# Patient Record
Sex: MALE | Race: WHITE | Employment: FULL TIME | URBAN - METROPOLITAN AREA
[De-identification: names, ages, dates, MRNs, and addresses within clinical notes are randomized per-mention and may not be internally consistent; named-entity substitution may affect disease eponyms.]

---

## 2019-01-14 ENCOUNTER — OFFICE VISIT (OUTPATIENT)
Dept: FAMILY MEDICINE | Facility: CLINIC | Age: 29
End: 2019-01-14
Payer: COMMERCIAL

## 2019-01-14 VITALS
TEMPERATURE: 98.1 F | HEIGHT: 73 IN | HEART RATE: 72 BPM | DIASTOLIC BLOOD PRESSURE: 62 MMHG | BODY MASS INDEX: 26.24 KG/M2 | SYSTOLIC BLOOD PRESSURE: 120 MMHG | WEIGHT: 198 LBS

## 2019-01-14 DIAGNOSIS — B07.8 COMMON WART: Primary | ICD-10-CM

## 2019-01-14 PROCEDURE — 17110 DESTRUCTION B9 LES UP TO 14: CPT | Performed by: NURSE PRACTITIONER

## 2019-01-14 ASSESSMENT — MIFFLIN-ST. JEOR: SCORE: 1922

## 2019-01-14 NOTE — PATIENT INSTRUCTIONS
WART CARE DISCUSSED. USE OF OTC PRODUCT STARTING IN FEW DAYS. GENTLE ABRAISION WITH PUMICE STONE OR EMERY BOARD WITH GOOD HANDWASHING AFTER. RETURN IN TWO WEEKS FOR REFREEZING UNTIL RESOLVED.

## 2019-01-14 NOTE — PROGRESS NOTES
"  SUBJECTIVE:                                                      Bryn Mccrary is a 28 year old male who presents to clinic today for the following health issues:    Concern - Pt states that for 2 months he has had a wart on the top of his right foot - has never been treated for it in the past.     HPI: Non-painful wart on right lateral dorsum of foot. No bleeding or discharge noted.     Problem list and histories reviewed & adjusted, as indicated.  Additional history: as documented    Reviewed and updated as needed this visit by clinical staff  Tobacco  Allergies  Meds  Problems  Med Hx  Surg Hx  Fam Hx  Soc Hx        Reviewed and updated as needed this visit by Provider  Tobacco  Allergies  Meds  Problems  Med Hx  Surg Hx  Fam Hx         ROS:  Constitutional, skin systems are negative, except as otherwise noted.    OBJECTIVE:                                                      /62 (BP Location: Left arm, Patient Position: Chair, Cuff Size: Adult Regular)   Pulse 72   Temp 98.1  F (36.7  C) (Tympanic)   Ht 1.854 m (6' 1\")   Wt 89.8 kg (198 lb)   BMI 26.12 kg/m   Body mass index is 26.12 kg/m .   GENERAL: healthy, alert, well nourished, well hydrated, no distress  SKIN: Raised, rough verruciform lesion (3 mm diameter) over dorsum of right foot.     Diagnostic test results:  none     ASSESSMENT/PLAN:                                                      Bryn was seen today for wart.      Lesion abraded / scraped with 11 blade scalpel and frozen with LN2 x3. Patient tolerated procedure well. No complications evident.     Diagnoses and all orders for this visit:    Common wart  PLAN:  WART CARE DISCUSSED. USE OF OTC PRODUCT STARTING IN FEW DAYS. GENTLE ABRAISION WITH PUMICE STONE OR EMERY BOARD WITH GOOD HANDWASHING AFTER. RETURN IN TWO WEEKS FOR REFREEZING UNTIL RESOLVED.      Risks, benefits and alternatives of treatments discussed. Plan agreed upon and all questions answered.  "     Follow-Up: Return in about 4 weeks (around 2/11/2019) for recurrent symptoms despite current therapy.    See Patient Instructions      PAULA Aparicio, CNP

## 2019-02-18 ENCOUNTER — OFFICE VISIT (OUTPATIENT)
Dept: FAMILY MEDICINE | Facility: CLINIC | Age: 29
End: 2019-02-18
Payer: COMMERCIAL

## 2019-02-18 VITALS
SYSTOLIC BLOOD PRESSURE: 112 MMHG | OXYGEN SATURATION: 100 % | BODY MASS INDEX: 25.6 KG/M2 | HEART RATE: 106 BPM | DIASTOLIC BLOOD PRESSURE: 72 MMHG | TEMPERATURE: 97.6 F | WEIGHT: 194 LBS

## 2019-02-18 DIAGNOSIS — B07.8 COMMON WART: Primary | ICD-10-CM

## 2019-02-18 PROCEDURE — 17110 DESTRUCTION B9 LES UP TO 14: CPT | Performed by: NURSE PRACTITIONER

## 2019-02-18 NOTE — PATIENT INSTRUCTIONS
WART CARE DISCUSSED. USE OF OTC PRODUCT STARTING IN FEW DAYS. GENTLE ABRASION WITH PUMICE STONE OR EMERY BOARD WITH GOOD HANDWASHING AFTER. RETURN IN TWO - FOUR WEEKS FOR REFREEZING UNTIL RESOLVED.

## 2019-02-18 NOTE — PROGRESS NOTES
SUBJECTIVE:                                                      Bryn Mccrary is a 28 year old male who presents to clinic today for the following health issues:    Concern - Derm problem   Onset: x 3 months     Description:   On top of the right foot, round, pinkish, hard, dry, flaky    Intensity: 0/10    Progression of Symptoms:  same    Accompanying Signs & Symptoms:  Denies discomfort, pain, pus, and drainage     Previous history of similar problem:   Once before     Precipitating factors:   Worsened by:     Alleviating factors:  Improved by: None     Therapies Tried and outcome: Cryo one month ago. W compound - shows no improvement     HPI: Non-painful wart on right lateral dorsum of foot. No bleeding or discharge noted.     Problem list and histories reviewed & adjusted, as indicated.  Additional history: as documented    Reviewed and updated as needed this visit by clinical staff  Tobacco  Allergies  Meds  Problems  Med Hx  Surg Hx  Fam Hx  Soc Hx        Reviewed and updated as needed this visit by Provider  Tobacco  Allergies  Meds  Problems  Med Hx  Surg Hx  Fam Hx         ROS:  Constitutional, skin systems are negative, except as otherwise noted.      OBJECTIVE:                                                      /72   Pulse 106   Temp 97.6  F (36.4  C)   Wt 88 kg (194 lb)   SpO2 100%   BMI 25.60 kg/m   Body mass index is 25.6 kg/m .   GENERAL: healthy, alert, well nourished, well hydrated, no distress  SKIN: Raised, rough verruciform lesion (3 mm diameter) over dorsum of right foot.     Diagnostic test results:  none     ASSESSMENT/PLAN:                                                      Bryn was seen today for derm problem.    Lesion abraded / scraped with 11 blade scalpel and frozen with LN2 x3. Patient tolerated procedure well. No complications evident.     Diagnoses and all orders for this visit:    Common wart  -     DESTRUCT BENIGN LESION, UP TO 14    PLAN:  WART  CARE DISCUSSED. USE OF OTC PRODUCT STARTING IN FEW DAYS. GENTLE ABRASION WITH PUMICE STONE OR EMERY BOARD WITH GOOD HANDWASHING AFTER. RETURN IN TWO WEEKS FOR REFREEZING UNTIL RESOLVED.       Risks, benefits and alternatives of treatments discussed. Plan agreed upon and all questions answered.      Follow-Up: Return in about 4 weeks (around 3/18/2019).    See Patient Instructions      PAULA Aparicio, CNP

## 2019-09-30 ENCOUNTER — HEALTH MAINTENANCE LETTER (OUTPATIENT)
Age: 29
End: 2019-09-30

## 2020-12-28 ENCOUNTER — NURSE TRIAGE (OUTPATIENT)
Dept: NURSING | Facility: CLINIC | Age: 30
End: 2020-12-28

## 2020-12-28 ENCOUNTER — ANCILLARY PROCEDURE (OUTPATIENT)
Dept: GENERAL RADIOLOGY | Facility: CLINIC | Age: 30
End: 2020-12-28
Attending: PHYSICIAN ASSISTANT
Payer: COMMERCIAL

## 2020-12-28 ENCOUNTER — OFFICE VISIT (OUTPATIENT)
Dept: URGENT CARE | Facility: URGENT CARE | Age: 30
End: 2020-12-28
Payer: COMMERCIAL

## 2020-12-28 VITALS
BODY MASS INDEX: 25.46 KG/M2 | RESPIRATION RATE: 20 BRPM | SYSTOLIC BLOOD PRESSURE: 132 MMHG | HEART RATE: 71 BPM | OXYGEN SATURATION: 100 % | WEIGHT: 193 LBS | TEMPERATURE: 98.9 F | DIASTOLIC BLOOD PRESSURE: 79 MMHG

## 2020-12-28 DIAGNOSIS — R07.89 ATYPICAL CHEST PAIN: Primary | ICD-10-CM

## 2020-12-28 DIAGNOSIS — R00.0 TACHYCARDIA: ICD-10-CM

## 2020-12-28 DIAGNOSIS — R07.89 CHEST PRESSURE: ICD-10-CM

## 2020-12-28 LAB
ALBUMIN SERPL-MCNC: 3.8 G/DL (ref 3.4–5)
ALP SERPL-CCNC: 47 U/L (ref 40–150)
ALT SERPL W P-5'-P-CCNC: 53 U/L (ref 0–70)
ANION GAP SERPL CALCULATED.3IONS-SCNC: 3 MMOL/L (ref 3–14)
AST SERPL W P-5'-P-CCNC: 49 U/L (ref 0–45)
BASOPHILS # BLD AUTO: 0 10E9/L (ref 0–0.2)
BASOPHILS NFR BLD AUTO: 0.4 %
BILIRUB SERPL-MCNC: 0.5 MG/DL (ref 0.2–1.3)
BUN SERPL-MCNC: 14 MG/DL (ref 7–30)
CALCIUM SERPL-MCNC: 8.7 MG/DL (ref 8.5–10.1)
CHLORIDE SERPL-SCNC: 110 MMOL/L (ref 94–109)
CO2 SERPL-SCNC: 27 MMOL/L (ref 20–32)
CREAT SERPL-MCNC: 0.83 MG/DL (ref 0.66–1.25)
DIFFERENTIAL METHOD BLD: NORMAL
EOSINOPHIL # BLD AUTO: 0.2 10E9/L (ref 0–0.7)
EOSINOPHIL NFR BLD AUTO: 3.1 %
ERYTHROCYTE [DISTWIDTH] IN BLOOD BY AUTOMATED COUNT: 13 % (ref 10–15)
ERYTHROCYTE [SEDIMENTATION RATE] IN BLOOD BY WESTERGREN METHOD: 6 MM/H (ref 0–15)
GFR SERPL CREATININE-BSD FRML MDRD: >90 ML/MIN/{1.73_M2}
GLUCOSE SERPL-MCNC: 92 MG/DL (ref 70–99)
HCT VFR BLD AUTO: 45.1 % (ref 40–53)
HGB BLD-MCNC: 14.9 G/DL (ref 13.3–17.7)
LYMPHOCYTES # BLD AUTO: 1.6 10E9/L (ref 0.8–5.3)
LYMPHOCYTES NFR BLD AUTO: 28.5 %
MCH RBC QN AUTO: 30.9 PG (ref 26.5–33)
MCHC RBC AUTO-ENTMCNC: 33 G/DL (ref 31.5–36.5)
MCV RBC AUTO: 94 FL (ref 78–100)
MONOCYTES # BLD AUTO: 0.4 10E9/L (ref 0–1.3)
MONOCYTES NFR BLD AUTO: 7.9 %
NEUTROPHILS # BLD AUTO: 3.3 10E9/L (ref 1.6–8.3)
NEUTROPHILS NFR BLD AUTO: 60.1 %
PLATELET # BLD AUTO: 218 10E9/L (ref 150–450)
POTASSIUM SERPL-SCNC: 4.3 MMOL/L (ref 3.4–5.3)
PROT SERPL-MCNC: 7.5 G/DL (ref 6.8–8.8)
RBC # BLD AUTO: 4.82 10E12/L (ref 4.4–5.9)
SODIUM SERPL-SCNC: 140 MMOL/L (ref 133–144)
TROPONIN I SERPL-MCNC: <0.015 UG/L (ref 0–0.04)
TSH SERPL DL<=0.005 MIU/L-ACNC: 1.4 MU/L (ref 0.4–4)
WBC # BLD AUTO: 5.5 10E9/L (ref 4–11)

## 2020-12-28 PROCEDURE — 99215 OFFICE O/P EST HI 40 MIN: CPT | Performed by: PHYSICIAN ASSISTANT

## 2020-12-28 PROCEDURE — 93000 ELECTROCARDIOGRAM COMPLETE: CPT | Performed by: PHYSICIAN ASSISTANT

## 2020-12-28 PROCEDURE — 71046 X-RAY EXAM CHEST 2 VIEWS: CPT | Performed by: RADIOLOGY

## 2020-12-28 PROCEDURE — 85652 RBC SED RATE AUTOMATED: CPT | Performed by: PHYSICIAN ASSISTANT

## 2020-12-28 PROCEDURE — 80050 GENERAL HEALTH PANEL: CPT | Performed by: PHYSICIAN ASSISTANT

## 2020-12-28 PROCEDURE — 36415 COLL VENOUS BLD VENIPUNCTURE: CPT | Performed by: PHYSICIAN ASSISTANT

## 2020-12-28 PROCEDURE — 84484 ASSAY OF TROPONIN QUANT: CPT | Performed by: PHYSICIAN ASSISTANT

## 2020-12-28 NOTE — TELEPHONE ENCOUNTER
"Patient states he has had intermittent  chest/heart pain for the past couple of weeks.  Pain is worse if he drinks alcohol or caffeine and his heart rate increased with moderate activity.  Unsure if pain radiates to arm.  Pain is \"minor\" now.  Routed to PCP for Second Level Triage and advised patient to go to ED with severe chest pain, difficulty breathing.    Zandra Jackson RN  [facility] Triage Nurse Advisor    Please close encounter when completed.    Additional Information    Negative: Severe difficulty breathing (e.g., struggling for each breath, speaks in single words)    Negative: Passed out (i.e., fainted, collapsed and was not responding)    Negative: Chest pain lasting longer than 5 minutes and ANY of the following:* Over 50 years old* Over 30 years old and at least one cardiac risk factor (i.e., high blood pressure, diabetes, high cholesterol, obesity, smoker or strong family history of heart disease)* Pain is crushing, pressure-like, or heavy * Took nitroglycerin and chest pain was not relieved* History of heart disease (i.e., angina, heart attack, bypass surgery, angioplasty, CHF)    Negative: Visible sweat on face or sweat dripping down face    Negative: Sounds like a life-threatening emergency to the triager    Negative: Followed an injury to chest    Negative: SEVERE chest pain    Negative: Pain also present in shoulder(s) or arm(s) or jaw    Negative: Difficulty breathing    Negative: Cocaine use within last 3 days    Negative: History of prior 'blood clot' in leg or lungs (i.e., deep vein thrombosis, pulmonary embolism)    Negative: Recent illness requiring prolonged bed rest (i.e., immobilization)    Negative: Hip or leg fracture in past 2 months (e.g, or had cast on leg or ankle)    Negative: Major surgery in the past month    Negative: Recent long-distance travel with prolonged time in car, bus, plane, or train (i.e., within past 2 weeks; 6 or more hours duration)    Negative: Heart " beating irregularly or very rapidly    Negative: Chest pain lasting longer than 5 minutes    Intermittent chest pain and pain has been increasing in severity or frequency    Protocols used: CHEST PAIN-A-OH

## 2020-12-28 NOTE — TELEPHONE ENCOUNTER
Called patient- he wants to be seen today- advised urgent care now as we do not have any openings in clinic today  States he does not have SOB/ worsening CP or rapid HR  He also cannot elicit the pain with breathing or movement.  Patient lives in [place] and will go to Urgent care now- stats he has to be to work in an hour so he will go now.    Elo QUIROSRN BSN  [facility]  [phone]

## 2020-12-28 NOTE — TELEPHONE ENCOUNTER
If chest pain worsens with activity or if he finds that he is having shortness of breath, he needs to go to the ED now. If his heart rate is rapid, he needs to go to the ED (could be PE or ACS). If he can elicit the pain with manual pressure or if it changes in character / severity when taking deep breaths, he may be able to wait until he can be seen in clinic. I have no openings, myself, today, so he would need to schedule with someone else. He should probably been seen today, however.     Thanks,    Sajan

## 2020-12-28 NOTE — PATIENT INSTRUCTIONS
Patient Education     Uncertain Causes of Chest Pain    Chest pain can happen for a number of reasons. Sometimes the cause can't be determined. If your condition does not seem serious, and your pain does not appear to be coming from your heart, your healthcare provider may recommend watching it closely. Sometimes the signs of a serious problem take more time to appear. Many problems not related to your heart can cause chest pain. These include:    Musculoskeletal. Costochondritis is an inflammation of the tissues around the ribs that can occur from trauma or overuse injuries, or a strain of the muscles of the chest wall    Respiratory. Pneumonia, collapsed lung (pneumothorax), or inflammation of the lining of the chest and lungs (pleurisy)    Gastrointestinal. Esophageal reflux, heartburn, ulcers, or gallbladder disease    Anxiety and panic disorders    Nerve compression and inflammation    Rare miscellaneous problems such as aortic aneurysm (a swelling of the large artery coming out of the heart) or pulmonary embolism (a blood clot in the lungs)  Home care  After your visit, follow these recommendations:    Rest today and avoid strenuous activity.    Take any prescribed medicine as directed.    Be aware of any recurrent chest pain and notice any changes  Follow-up care  Follow up with your healthcare provider if you do not start to feel better within 24 hours, or as advised.  Call 911  Call 911 if any of these occur:    A change in the type of pain: if it feels different, becomes more severe, lasts longer, or begins to spread into your shoulder, arm, neck, jaw or back    Shortness of breath or increased pain with breathing    Weakness, dizziness, or fainting    Rapid heart beat    Crushing sensation in your chest  When to seek medical advice  Call your healthcare provider right away if any of the following occur:    Cough with dark colored sputum (phlegm) or blood    Fever of 100.4 F (38 C) or higher, or as  directed by your healthcare provider    Swelling, pain or redness in one leg  Albert last reviewed this educational content on 5/1/2018 2000-2020 [employer]. [address] [facility], [place] [address]. All rights reserved. This information is not intended as a substitute for professional medical care. Always follow your healthcare professional's instructions.           Patient Education     Understanding Tachycardia    The heart has an electrical system that sends signals to control the heartbeat. Any abnormal change in the speed or pattern of the heartbeat is called an arrhythmia. If you have an arrhythmia that causes the heart to beat faster than normal, this is known as tachycardia. There are many types of tachycardia. They can affect the heart s upper chambers (atria), the heart s lower chambers (ventricles), or both.   What causes tachycardia?   Many things can cause tachycardia, including:    Damage to heart tissue from heart disease, past heart attack, or heart surgery    Abnormal electrical pathways in the heart    Problems with the heart s structure that you are born with (congenital heart defect)    High blood pressure    Overactive thyroid    Use of certain medicines    Severe blood loss or anemia    Dehydration    Severe stress, fear, or anxiety    Smoking    Too much alcohol or caffeine    Abuse of certain street drugs, such as cocaine    Infections    Certain inflammatory conditions    Chronic  pain syndromes  What are the symptoms of tachycardia?   Tachycardia can cause a fast, pounding, or irregular heartbeat. It can also make it harder for the heart to pump blood efficiently to the body. This may cause symptoms such as:     Shortness of breath    Tiredness    Dizziness or fainting    Chest pain  Some people with tachycardia may have no symptoms at all.  How is tachycardia treated?   Treatment for tachycardia depends on the cause. It also depends on the type you have and how severe  your symptoms are. Tachycardia in the ventricles is often more serious than in the atria. For this reason, it may need to be treated right away. Possible treatments include:     Treating the underlying cause. For instance, if a medicine is causing your tachycardia, changing the dosage or stopping the medicine with your doctor s guidance may correct the problem.    Lifestyle changes. These include getting enough sleep and reducing stress. They also include avoiding caffeine, alcohol, tobacco, and illegal drugs.    Vagal maneuvers. These are techniques that may help interrupt a fast heartbeat and slow it down. One example is to take a deep breath and bear down hard while holding your breath.     Medicines. These may be used to help slow down a fast heartbeat. They may also be used to regulate the pattern of the heartbeat.    Electrical cardioversion. Special pads or paddles are used to send one or more brief  electrical shocks to the heart. This can help restore the heartbeat to normal.    Ablation. A long thin tube called a catheter is inserted into a blood vessel and threaded to the heart. The catheter sends out hot or cold energy to the areas causing abnormal signals. This destroys tissue or cells where the abnormal electrical signal originates. This may stop certain types of tachycardia.    Pacemaker. This is a device that is placed just under the skin in the chest. It sends paced signals to make the heart beat at a predetermined rate and rhythm. While it can't slow your heart rate, you may need this if certain medicines used to treat tachycardia result in a heart rate that is too slow .      Implantable cardioverter defibrillator (ICD).  This is a device that is placed just under the skin in the chest or armpit. The ICD monitors your heart rate. When needed, it sends controlled burst of signals to the heart to overdrive a tachycardia in the ventricles.  It can also send a single stronger shock to the heart to  stop a life-threatening type of tachycardia, if needed.    Surgery.During surgery, different techniques may be used to create scar tissue in the areas of the heart causing abnormal signals. This may help stop certain types of tachycardia.  What are possible complications of tachycardia?   These can include:    Blood clots or stroke    Heart failure. With this problem, the heart muscle is so weak it no longer pumps blood well    Fainting    Sudden cardiac arrest. This is when the heart suddenly stops beating  When should I call my healthcare provider?   Call your healthcare provider right away if you have any of these:    Symptoms that don t get better with treatment, or get worse    New symptoms  Albert last reviewed this educational content on 5/1/2019 2000-2020 [employer]. [address] [place] [address], [place] [address]. All rights reserved. This information is not intended as a substitute for professional medical care. Always follow your healthcare professional's instructions.

## 2020-12-29 NOTE — PROGRESS NOTES
SUBJECTIVE:  Bryn Mccrary is a 30 year old male who presents to the office with the CC of intermittent tachycardia  Patient complains of tachycardia, mild chest discomfort.  The pain is characterized as mild dull located mid chst with radiation to none. Symptoms began this last week and are interemittent  Cardiac risk factors: He has cut back margarita, caffeine    Past Medical History:   Diagnosis Date     Depression      NO ACTIVE PROBLEMS      ALLERGIES  No Known Allergies     Family History   Problem Relation Age of Onset     Family History Negative Mother      Family History Negative Father      Diabetes No family hx of      Cancer - colorectal No family hx of        Social History     Tobacco Use     Smoking status: Former Smoker     Smokeless tobacco: Never Used   Substance Use Topics     Alcohol use: Yes     Comment: social       ROS:CONSTITUTIONAL:NEGATIVE for fever, chills, change in weight  INTEGUMENTARY/SKIN: NEGATIVE for worrisome rashes, moles or lesions  ENT/MOUTH: NEGATIVE for ear, mouth and throat problems  RESP:NEGATIVE for significant cough or SOB  CV: POSITIVE for tachycardia  GI: NEGATIVE for nausea, abdominal pain, heartburn, or change in bowel habits  : negative for dysuria, hematuria, decreased urinary stream, erectile dysfunction  MUSCULOSKELETAL: NEGATIVE for significant arthralgias or myalgia  NEURO: NEGATIVE for weakness, dizziness or paresthesias  PSYCHIATRIC: NEGATIVE for changes in mood or affect    EXAM:  /79   Pulse 71   Temp 98.9  F (37.2  C)   Resp 20   Wt 87.5 kg (193 lb)   SpO2 100%   BMI 25.46 kg/m    GENERAL APPEARANCE: healthy, alert and no distress  EYES: EOMI,  PERRL, conjunctiva clear  HENT: ear canals and TM's normal.  Nose and mouth without ulcers, erythema or lesions  NECK: supple, nontender, no lymphadenopathy  RESP: lungs clear to auscultation - no rales, rhonchi or wheezes  CV: regular rates and rhythm, normal S1 S2, no murmur noted  ABDOMEN:  soft,  nontender, no HSM or masses and bowel sounds normal  Extremities: no peripheral edema or tenderness, peripheral pulses normal  MS: extremities normal- no gross deformities noted, no erythema, FROM noted in all extremities  NEURO: Normal strength and tone, sensory exam grossly normal,  normal speech and mentation  SKIN: no suspicious lesions or rashes     Office EKG demonstrates:  appears normal, NSR, appears normal, NSR,normal axis, normal intervals, no acute ST/T changes c/w ischemia,no LVH by voltage criteria,unchanged from previous tracings    Results for orders placed or performed in visit on 12/28/20   XR Chest 2 Views     Status: None    Narrative    CHEST TWO VIEWS   12/28/2020 2:19 PM     HISTORY: Atypical chest pain.    COMPARISON: Chest x-rays dated 12/4/2007.    FINDINGS:  The lungs are clear. No pleural effusions or pneumothorax.  Heart size and pulmonary vascularity are within normal limits. No  acute fracture. Subtle density projecting over the medial aspect of  the mid right scapula is better seen on the old exam of 2007. This  could represent an osteochondroma. Recommend clinical correlation. If  the patient's symptoms are associated with this area, further  evaluation with CT or MRI may be helpful.      Impression    IMPRESSION:   1. Small osseous density adjacent to the mid to upper right scapula  corresponds to the finding on the prior study and could represent an  osseous excrescence from the scapula or from the adjacent rib. This  could represent an osteochondroma and could cause adjacent erosive  changes and chest pain in the appropriate clinical setting. If this  patient has symptoms associated with this area, then further  evaluation with CT may be helpful.  2. No other evidence of acute cardiopulmonary disease is seen.    CHAVA MAE MD   Troponin I     Status: None   Result Value Ref Range    Troponin I ES <0.015 0.000 - 0.045 ug/L   CBC with platelets differential     Status: None    Result Value Ref Range    WBC 5.5 4.0 - 11.0 10e9/L    RBC Count 4.82 4.4 - 5.9 10e12/L    Hemoglobin 14.9 13.3 - 17.7 g/dL    Hematocrit 45.1 40.0 - 53.0 %    MCV 94 78 - 100 fl    MCH 30.9 26.5 - 33.0 pg    MCHC 33.0 31.5 - 36.5 g/dL    RDW 13.0 10.0 - 15.0 %    Platelet Count 218 150 - 450 10e9/L    % Neutrophils 60.1 %    % Lymphocytes 28.5 %    % Monocytes 7.9 %    % Eosinophils 3.1 %    % Basophils 0.4 %    Absolute Neutrophil 3.3 1.6 - 8.3 10e9/L    Absolute Lymphocytes 1.6 0.8 - 5.3 10e9/L    Absolute Monocytes 0.4 0.0 - 1.3 10e9/L    Absolute Eosinophils 0.2 0.0 - 0.7 10e9/L    Absolute Basophils 0.0 0.0 - 0.2 10e9/L    Diff Method Automated Method    TSH with free T4 reflex     Status: None   Result Value Ref Range    TSH 1.40 0.40 - 4.00 mU/L   Comprehensive metabolic panel     Status: Abnormal   Result Value Ref Range    Sodium 140 133 - 144 mmol/L    Potassium 4.3 3.4 - 5.3 mmol/L    Chloride 110 (H) 94 - 109 mmol/L    Carbon Dioxide 27 20 - 32 mmol/L    Anion Gap 3 3 - 14 mmol/L    Glucose 92 70 - 99 mg/dL    Urea Nitrogen 14 7 - 30 mg/dL    Creatinine 0.83 0.66 - 1.25 mg/dL    GFR Estimate >90 >60 mL/min/[1.73_m2]    GFR Estimate If Black >90 >60 mL/min/[1.73_m2]    Calcium 8.7 8.5 - 10.1 mg/dL    Bilirubin Total 0.5 0.2 - 1.3 mg/dL    Albumin 3.8 3.4 - 5.0 g/dL    Protein Total 7.5 6.8 - 8.8 g/dL    Alkaline Phosphatase 47 40 - 150 U/L    ALT 53 0 - 70 U/L    AST 49 (H) 0 - 45 U/L   ESR: Erythrocyte sedimentation rate     Status: None   Result Value Ref Range    Sed Rate 6 0 - 15 mm/h       Assessment  / IMPRESSION/PLAN    ICD-10-CM    1. Atypical chest pain  R07.89 Troponin I     Comprehensive metabolic panel     ESR: Erythrocyte sedimentation rate     XR Chest 2 Views   2. Tachycardia  R00.0 CBC with platelets differential     TSH with free T4 reflex     Comprehensive metabolic panel   3. Chest pressure  R07.89 CBC with platelets differential       Orders Placed This Encounter     XR Chest 2  Views     Troponin I     CBC with platelets differential     TSH with free T4 reflex     Comprehensive metabolic panel     ESR: Erythrocyte sedimentation rate       Chest xray neg for pneumothorax  TSH to rule out thyroid dysfuction  CBC to evaluate for anemia  EKG and troponin to rule out cardiac damage  EKG and ESR to rule out pericarditis    Due to ongoing tachycardia, advised to follow up with IM for Holter Monitor  Possible cardiology referral if symptoms persist.

## 2020-12-30 ENCOUNTER — OFFICE VISIT (OUTPATIENT)
Dept: INTERNAL MEDICINE | Facility: CLINIC | Age: 30
End: 2020-12-30
Payer: COMMERCIAL

## 2020-12-30 VITALS
HEART RATE: 68 BPM | TEMPERATURE: 97.5 F | DIASTOLIC BLOOD PRESSURE: 72 MMHG | BODY MASS INDEX: 27.99 KG/M2 | OXYGEN SATURATION: 98 % | WEIGHT: 211.2 LBS | SYSTOLIC BLOOD PRESSURE: 114 MMHG | HEIGHT: 73 IN

## 2020-12-30 DIAGNOSIS — R00.2 PALPITATIONS: Primary | ICD-10-CM

## 2020-12-30 PROCEDURE — 99213 OFFICE O/P EST LOW 20 MIN: CPT | Performed by: INTERNAL MEDICINE

## 2020-12-30 ASSESSMENT — MIFFLIN-ST. JEOR: SCORE: 1971.88

## 2020-12-30 ASSESSMENT — ANXIETY QUESTIONNAIRES
2. NOT BEING ABLE TO STOP OR CONTROL WORRYING: SEVERAL DAYS
3. WORRYING TOO MUCH ABOUT DIFFERENT THINGS: SEVERAL DAYS
7. FEELING AFRAID AS IF SOMETHING AWFUL MIGHT HAPPEN: SEVERAL DAYS
GAD7 TOTAL SCORE: 4
6. BECOMING EASILY ANNOYED OR IRRITABLE: NOT AT ALL
5. BEING SO RESTLESS THAT IT IS HARD TO SIT STILL: NOT AT ALL
1. FEELING NERVOUS, ANXIOUS, OR ON EDGE: SEVERAL DAYS

## 2020-12-30 ASSESSMENT — PATIENT HEALTH QUESTIONNAIRE - PHQ9
SUM OF ALL RESPONSES TO PHQ QUESTIONS 1-9: 0
5. POOR APPETITE OR OVEREATING: NOT AT ALL

## 2020-12-30 NOTE — NURSING NOTE
"Chief Complaint   Patient presents with     UC Follow-Up     /72   Pulse 68   Temp 97.5  F (36.4  C) (Temporal)   Ht 1.854 m (6' 1\")   Wt 95.8 kg (211 lb 3.2 oz)   SpO2 98%   BMI 27.86 kg/m   Estimated body mass index is 27.86 kg/m  as calculated from the following:    Height as of this encounter: 1.854 m (6' 1\").    Weight as of this encounter: 95.8 kg (211 lb 3.2 oz).        Health Maintenance due pending provider review:  Declines flu vacc today        Alley Leung, CMA  "

## 2020-12-30 NOTE — PROGRESS NOTES
"Subjective     Bryn Mccrary is a 30 year old male who presents to clinic today for the following health issues:    HPI         ED/UC Followup:    Facility:  [facility]  Date of visit: 12/28/2020  Reason for visit: chest pain  Current Status: did occur yesterday but much milder than usual     Seen in the urgent care for chest discomfort and palpitations.  He notes a sensation of either fast heart rates or hyperdynamic beats.          Review of Systems   Constitutional, HEENT, cardiovascular, pulmonary, gi and gu systems are negative, except as otherwise noted.      Objective    /72   Pulse 68   Temp 97.5  F (36.4  C) (Temporal)   Ht 1.854 m (6' 1\")   Wt 95.8 kg (211 lb 3.2 oz)   SpO2 98%   BMI 27.86 kg/m    Body mass index is 27.86 kg/m .  Physical Exam   GENERAL APPEARANCE: healthy, alert and no distress  HENT: ear canals and TM's normal and nose and mouth without ulcers or lesions  NECK: no adenopathy, no asymmetry, masses, or scars and thyroid normal to palpation  RESP: lungs clear to auscultation - no rales, rhonchi or wheezes  CV: regular rates and rhythm, normal S1 S2, no S3 or S4 and no murmur, click or rub    No results found for this or any previous visit (from the past 24 hour(s)).        Assessment & Plan     Palpitations  Nothing significant.  Reviewed is labs, EKG and other studies done at the urgent care visit.  Recommend just monitor symptoms reduce caffeine intake and monitor his stress level.  If symptoms do not seem to resolve and we could consider event monitor.       BMI:   Estimated body mass index is 27.86 kg/m  as calculated from the following:    Height as of this encounter: 1.854 m (6' 1\").    Weight as of this encounter: 95.8 kg (211 lb 3.2 oz).            See Patient Instructions    Return for Recheck if no improvement.    Nile Espinoza MD  [facility]      "

## 2020-12-31 ASSESSMENT — ANXIETY QUESTIONNAIRES: GAD7 TOTAL SCORE: 4

## 2021-01-15 ENCOUNTER — HEALTH MAINTENANCE LETTER (OUTPATIENT)
Age: 31
End: 2021-01-15

## 2021-10-24 ENCOUNTER — HEALTH MAINTENANCE LETTER (OUTPATIENT)
Age: 31
End: 2021-10-24

## 2022-02-13 ENCOUNTER — HEALTH MAINTENANCE LETTER (OUTPATIENT)
Age: 32
End: 2022-02-13

## 2022-10-15 ENCOUNTER — HEALTH MAINTENANCE LETTER (OUTPATIENT)
Age: 32
End: 2022-10-15

## 2023-03-26 ENCOUNTER — HEALTH MAINTENANCE LETTER (OUTPATIENT)
Age: 33
End: 2023-03-26